# Patient Record
Sex: FEMALE | Race: WHITE | URBAN - METROPOLITAN AREA
[De-identification: names, ages, dates, MRNs, and addresses within clinical notes are randomized per-mention and may not be internally consistent; named-entity substitution may affect disease eponyms.]

---

## 2022-11-07 ENCOUNTER — OFFICE VISIT (OUTPATIENT)
Dept: URBAN - METROPOLITAN AREA CLINIC 41 | Facility: CLINIC | Age: 57
End: 2022-11-07
Payer: COMMERCIAL

## 2022-11-07 DIAGNOSIS — H31.092 OTHER CHORIORETINAL SCARS, LEFT EYE: ICD-10-CM

## 2022-11-07 DIAGNOSIS — H25.13 AGE-RELATED NUCLEAR CATARACT, BILATERAL: ICD-10-CM

## 2022-11-07 DIAGNOSIS — H43.813 VITREOUS DEGENERATION, BILATERAL: Primary | ICD-10-CM

## 2022-11-07 PROCEDURE — 99204 OFFICE O/P NEW MOD 45 MIN: CPT | Performed by: OPHTHALMOLOGY

## 2022-11-07 PROCEDURE — 92201 OPSCPY EXTND RTA DRAW UNI/BI: CPT | Performed by: OPHTHALMOLOGY

## 2022-11-07 PROCEDURE — 92134 CPTRZ OPH DX IMG PST SGM RTA: CPT | Performed by: OPHTHALMOLOGY

## 2022-11-07 ASSESSMENT — INTRAOCULAR PRESSURE
OD: 10
OS: 11

## 2022-11-07 NOTE — IMPRESSION/PLAN
Impression: Vitreous degeneration, bilateral: H43.813. OD: likely chronic OS: acute Plan: Patient presents with new flashes/floaters OS. Exam demonstrates an acute PVD. Indirect ophthalmoscopy with scleral depression was performed and no retinal breaks or evidence of detachment were identified. The diagnosis, natural history, and prognosis of PVD were discussed at length. The signs and symptoms of retinal break and/or detachment including increased flashes, new-onset floaters, and development of a shadow/curtain shade in the visual field were reviewed. The patient was educated to call immediately with any new symptoms. RTC 1-2 mo with OCT OU, sooner PRN.  ([place] be back home in [place])

## 2022-11-07 NOTE — IMPRESSION/PLAN
Impression: Other chorioretinal scars, left eye: H31.092. Plan: No RT or RD. Not worrisome.   Observe